# Patient Record
Sex: MALE | Race: OTHER
[De-identification: names, ages, dates, MRNs, and addresses within clinical notes are randomized per-mention and may not be internally consistent; named-entity substitution may affect disease eponyms.]

---

## 2019-08-10 ENCOUNTER — HOSPITAL ENCOUNTER (EMERGENCY)
Dept: HOSPITAL 61 - ER | Age: 49
Discharge: HOME | End: 2019-08-10
Payer: COMMERCIAL

## 2019-08-10 VITALS — HEIGHT: 62 IN | WEIGHT: 162 LBS | BODY MASS INDEX: 29.81 KG/M2

## 2019-08-10 DIAGNOSIS — R42: Primary | ICD-10-CM

## 2019-08-10 DIAGNOSIS — F17.210: ICD-10-CM

## 2019-08-10 DIAGNOSIS — R51: ICD-10-CM

## 2019-08-10 DIAGNOSIS — H83.03: ICD-10-CM

## 2019-08-10 DIAGNOSIS — R11.0: ICD-10-CM

## 2019-08-10 LAB
ALBUMIN SERPL-MCNC: 3.8 G/DL (ref 3.4–5)
ALBUMIN/GLOB SERPL: 0.9 {RATIO} (ref 1–1.7)
ALP SERPL-CCNC: 86 U/L (ref 46–116)
ALT SERPL-CCNC: 42 U/L (ref 16–63)
ANION GAP SERPL CALC-SCNC: 9 MMOL/L (ref 6–14)
AST SERPL-CCNC: 25 U/L (ref 15–37)
BASOPHILS # BLD AUTO: 0.1 X10^3/UL (ref 0–0.2)
BASOPHILS NFR BLD: 1 % (ref 0–3)
BILIRUB SERPL-MCNC: 0.5 MG/DL (ref 0.2–1)
BUN SERPL-MCNC: 17 MG/DL (ref 8–26)
BUN/CREAT SERPL: 17 (ref 6–20)
CALCIUM SERPL-MCNC: 9.6 MG/DL (ref 8.5–10.1)
CHLORIDE SERPL-SCNC: 103 MMOL/L (ref 98–107)
CK SERPL-CCNC: 150 U/L (ref 39–308)
CO2 SERPL-SCNC: 27 MMOL/L (ref 21–32)
CREAT SERPL-MCNC: 1 MG/DL (ref 0.7–1.3)
EOSINOPHIL NFR BLD: 0.6 X10^3/UL (ref 0–0.7)
EOSINOPHIL NFR BLD: 5 % (ref 0–3)
ERYTHROCYTE [DISTWIDTH] IN BLOOD BY AUTOMATED COUNT: 14 % (ref 11.5–14.5)
GFR SERPLBLD BASED ON 1.73 SQ M-ARVRAT: 80.4 ML/MIN
GLOBULIN SER-MCNC: 4.2 G/DL (ref 2.2–3.8)
GLUCOSE SERPL-MCNC: 103 MG/DL (ref 70–99)
HCT VFR BLD CALC: 49 % (ref 39–53)
HGB BLD-MCNC: 17.5 G/DL (ref 13–17.5)
LYMPHOCYTES # BLD: 3.2 X10^3/UL (ref 1–4.8)
LYMPHOCYTES NFR BLD AUTO: 24 % (ref 24–48)
MAGNESIUM SERPL-MCNC: 2 MG/DL (ref 1.8–2.4)
MCH RBC QN AUTO: 30 PG (ref 25–35)
MCHC RBC AUTO-ENTMCNC: 36 G/DL (ref 31–37)
MCV RBC AUTO: 84 FL (ref 79–100)
MONO #: 0.9 X10^3/UL (ref 0–1.1)
MONOCYTES NFR BLD: 7 % (ref 0–9)
NEUT #: 8.3 X10^3/UL (ref 1.8–7.7)
NEUTROPHILS NFR BLD AUTO: 63 % (ref 31–73)
PLATELET # BLD AUTO: 231 X10^3/UL (ref 140–400)
POTASSIUM SERPL-SCNC: 4.1 MMOL/L (ref 3.5–5.1)
PROT SERPL-MCNC: 8 G/DL (ref 6.4–8.2)
RBC # BLD AUTO: 5.85 X10^6/UL (ref 4.3–5.7)
SODIUM SERPL-SCNC: 139 MMOL/L (ref 136–145)
WBC # BLD AUTO: 13.2 X10^3/UL (ref 4–11)

## 2019-08-10 PROCEDURE — 93005 ELECTROCARDIOGRAM TRACING: CPT

## 2019-08-10 PROCEDURE — 96361 HYDRATE IV INFUSION ADD-ON: CPT

## 2019-08-10 PROCEDURE — 36415 COLL VENOUS BLD VENIPUNCTURE: CPT

## 2019-08-10 PROCEDURE — 82553 CREATINE MB FRACTION: CPT

## 2019-08-10 PROCEDURE — 85025 COMPLETE CBC W/AUTO DIFF WBC: CPT

## 2019-08-10 PROCEDURE — 80053 COMPREHEN METABOLIC PANEL: CPT

## 2019-08-10 PROCEDURE — 96374 THER/PROPH/DIAG INJ IV PUSH: CPT

## 2019-08-10 PROCEDURE — 99285 EMERGENCY DEPT VISIT HI MDM: CPT

## 2019-08-10 PROCEDURE — 84484 ASSAY OF TROPONIN QUANT: CPT

## 2019-08-10 PROCEDURE — 83735 ASSAY OF MAGNESIUM: CPT

## 2019-08-10 NOTE — PHYS DOC
Past Medical History


Past Medical History:  No Pertinent History


Past Surgical History:  No Surgical History


Smoking:  Cigarettes, Less than 1pk/day


Additional Information:  


1/4 ppd


Alcohol Use:  None


Drug Use:  None





Adult General


Chief Complaint


Chief Complaint:  DIZZY/LIGHT HEADED





HPI


HPI


Mr. Montes De Oca is a 49yo  M w/ no significant PMH who presents w/ 2 weeks of 

positional vertigo that lasts 5-6 minutes. Vertigo occurs when patient moves to 

seated position from laying down. Patient states that they feel as through their

ear canals close down and they are unable to hear when these episodes occur. 

Associated w/ nausea and HA. Denies recent sick contacts.





Review of Systems


Review of Systems


Constitutional: Denies fever or chills 


Eyes: Denies redness or eye pain 


HENT: Denies nasal congestion or sore throat


Respiratory: Denies cough or shortness of breath 


Cardiovascular: Denies chest pain or palpitations


GI: Reports nausea. Denies abdominal pain, or vomiting


: Denies dysuria or hematuria


Musculoskeletal: Denies back pain or joint pain


Integument: Denies rash or skin lesions 


Neurologic: Reports dizziness and HA. Denies focal weakness or sensory changes





Complete systems were reviewed and found to be within normal limits, except as 

documented in this note.





Current Medications


Current Medications





Current Medications








 Medications


  (Trade)  Dose


 Ordered  Sig/Halima  Start Time


 Stop Time Status Last Admin


Dose Admin


 


 Dexamethasone


 Sodium Phosphate


  (Decadron)  10 mg  1X  ONCE  8/10/19 20:30


 8/10/19 20:31 DC 8/10/19 20:00


10 MG


 


 Meclizine HCl


  (Antivert)  25 mg  1X  ONCE  8/10/19 20:30


 8/10/19 20:31 DC 8/10/19 20:00


25 MG


 


 Sodium Chloride  1,000 ml @ 


 1,000 mls/hr  1X  ONCE  8/10/19 19:30


 8/10/19 20:29 DC 8/10/19 20:00


1,000 MLS/HR











Allergies


Allergies





Allergies








Coded Allergies Type Severity Reaction Last Updated Verified


 


  No Known Drug Allergies    8/10/19 No











Physical Exam


Physical Exam


Constitutional: Well developed, well nourished, no acute distress, non-toxic 

appearance


HENT: Normocephalic, atraumatic, oropharynx moist, TM clear and non-bulging w/o 

exudate, no pinna tenderness


Eyes: PERRL, EOMI, conjunctiva normal, no discharge, no nystagmus


Neck: Normal range of motion, no tenderness, supple, no cervical or 

supraclavicular LAD


Cardiovascular: Heart rate normal, regular rhythm w/o gallops, rubs, or murmurs.

UE radial pulses intact 2/4 b/l


Lungs & Thorax:  Bilateral breath sounds clear to auscultation throughout, no 

wheezing


Abdomen: Soft, no tenderness, non-distended


Skin: Warm, dry, no erythema, no rash


Back: No tenderness, no CVA tenderness


Extremities: No tenderness, ROM intact, no edema


Neurologic: Alert and oriented X 3, normal motor function, normal sensory 

function, no focal deficits noted


Psychologic: Affect normal, judgement normal, mood normal





Current Patient Data


Vital Signs





                                   Vital Signs








  Date Time  Temp Pulse Resp B/P (MAP) Pulse Ox O2 Delivery O2 Flow Rate FiO2


 


8/10/19 19:21 98.2 74 18 139/83 (101) 100 Room Air  





 98.2       








Lab Values





                                Laboratory Tests








Test


 8/10/19


19:39


 


White Blood Count


 13.2 x10^3/uL


(4.0-11.0)  H


 


Red Blood Count


 5.85 x10^6/uL


(4.30-5.70)  H


 


Hemoglobin


 17.5 g/dL


(13.0-17.5)


 


Hematocrit


 49.0 %


(39.0-53.0)


 


Mean Corpuscular Volume


 84 fL ()





 


Mean Corpuscular Hemoglobin 30 pg (25-35)  


 


Mean Corpuscular Hemoglobin


Concent 36 g/dL


(31-37)


 


Red Cell Distribution Width


 14.0 %


(11.5-14.5)


 


Platelet Count


 231 x10^3/uL


(140-400)


 


Neutrophils (%) (Auto) 63 % (31-73)  


 


Lymphocytes (%) (Auto) 24 % (24-48)  


 


Monocytes (%) (Auto) 7 % (0-9)  


 


Eosinophils (%) (Auto) 5 % (0-3)  H


 


Basophils (%) (Auto) 1 % (0-3)  


 


Neutrophils # (Auto)


 8.3 x10^3/uL


(1.8-7.7)  H


 


Lymphocytes # (Auto)


 3.2 x10^3/uL


(1.0-4.8)


 


Monocytes # (Auto)


 0.9 x10^3/uL


(0.0-1.1)


 


Eosinophils # (Auto)


 0.6 x10^3/uL


(0.0-0.7)


 


Basophils # (Auto)


 0.1 x10^3/uL


(0.0-0.2)


 


Sodium Level


 139 mmol/L


(136-145)


 


Potassium Level


 4.1 mmol/L


(3.5-5.1)


 


Chloride Level


 103 mmol/L


()


 


Carbon Dioxide Level


 27 mmol/L


(21-32)


 


Anion Gap 9 (6-14)  


 


Blood Urea Nitrogen


 17 mg/dL


(8-26)


 


Creatinine


 1.0 mg/dL


(0.7-1.3)


 


Estimated GFR


(Cockcroft-Gault) 80.4  





 


BUN/Creatinine Ratio 17 (6-20)  


 


Glucose Level


 103 mg/dL


(70-99)  H


 


Calcium Level


 9.6 mg/dL


(8.5-10.1)


 


Magnesium Level


 2.0 mg/dL


(1.8-2.4)


 


Total Bilirubin


 0.5 mg/dL


(0.2-1.0)


 


Aspartate Amino Transferase


(AST) 25 U/L (15-37)





 


Alanine Aminotransferase (ALT)


 42 U/L (16-63)





 


Alkaline Phosphatase


 86 U/L


()


 


Creatine Kinase


 150 U/L


()


 


Creatine Kinase MB (Mass)


 1.8 ng/mL


(0.0-3.6)


 


Creatine Kinase MB Relative


Index 1.2 % (0-4)  





 


Troponin I Quantitative


 < 0.017 ng/mL


(0.000-0.055)


 


Total Protein


 8.0 g/dL


(6.4-8.2)


 


Albumin


 3.8 g/dL


(3.4-5.0)


 


Albumin/Globulin Ratio


 0.9 (1.0-1.7)


L





                                Laboratory Tests


8/10/19 19:39








                                Laboratory Tests


8/10/19 19:39














EKG


EKG


EKG obtained @ 1923 and read @ 1928. NSR w/o ST-elevation. 72 BPM.[]





Radiology/Procedures


Radiology/Procedures


[]





Course & Med Decision Making


Course & Med Decision Making


Pertinent Labs reviewed. (See chart for details)





Patient presented w/ dizziness. EKG NSR. Patient administered meclizine. Patient

 stable for discharge with outpatient follow-up with PCP. Discussed findings and

 plan with patient and family, who acknowledge understanding and agreement.


[]





Dragon Disclaimer


Dragon Disclaimer


This electronic medical record was generated, in whole or in part, using a voice

 recognition dictation system.





Departure


Departure


Impression:  


   Primary Impression:  


   Vertigo


   Additional Impression:  


   Acute labyrinthitis


Disposition:  01 HOME, SELF-CARE


Condition:  STABLE


Referrals:  


NO PCP (PCP)


Patient Instructions:  Labyrinthitis (Inner Ear Inflammation)-Brief, Vertigo, 

Easy-to-Read


Scripts


Prednisone (PREDNISONE) 20 Mg Tablet


2 TAB PO DAILY, #8 TAB


   Start this medication tomorrow, Sunday, 8/11.


   Prov: BRADLY MILNER DO         8/10/19 


Meclizine Hcl (MECLIZINE HCL) 25 Mg Tablet


1 TAB PO PRN PRN for DIZZINESS, #14 TAB


   Prov: BRADLY MILNER DO         8/10/19





Problem Qualifiers








   Additional Impression:  


   Acute labyrinthitis


   Laterality:  bilateral  Qualified Codes:  H83.03 - Labyrinthitis, bilateral








BRADLY MILNER DO             Aug 10, 2019 20:45

## 2019-08-11 NOTE — EKG
[facility]

              [address] [place] [address]

Test Date:    2019-08-10               Test Time:    19:23:22

Pat Name:     RAUL LINDER               Department:   

Patient ID:   [id number]           Room:          

Gender:                               Technician:   

:          [id number]               Requested By: BRADLY MILNER

Order Number: [id number]           Reading MD:     

                                 Measurements

Intervals                              Axis          

Rate:         72                       P:            34

TN:           176                      QRS:          47

QRSD:         84                       T:            31

QT:           346                                    

QTc:          380                                    

                           Interpretive Statements

SINUS RHYTHM

OTHERWISE NORMAL ECG

RI6.01          Unconfirmed report

No previous ECG available for comparison